# Patient Record
Sex: MALE | Race: WHITE
[De-identification: names, ages, dates, MRNs, and addresses within clinical notes are randomized per-mention and may not be internally consistent; named-entity substitution may affect disease eponyms.]

---

## 2019-06-17 ENCOUNTER — HOSPITAL ENCOUNTER (EMERGENCY)
Dept: HOSPITAL 88 - ER | Age: 59
Discharge: HOME | End: 2019-06-17
Payer: COMMERCIAL

## 2019-06-17 VITALS — WEIGHT: 270 LBS | BODY MASS INDEX: 36.57 KG/M2 | HEIGHT: 72 IN

## 2019-06-17 VITALS — SYSTOLIC BLOOD PRESSURE: 136 MMHG | DIASTOLIC BLOOD PRESSURE: 86 MMHG

## 2019-06-17 DIAGNOSIS — I10: ICD-10-CM

## 2019-06-17 DIAGNOSIS — R31.0: Primary | ICD-10-CM

## 2019-06-17 DIAGNOSIS — R30.0: ICD-10-CM

## 2019-06-17 LAB
BACTERIA URNS QL MICRO: (no result) /HPF
BILIRUB UR QL: NEGATIVE
CLARITY UR: (no result)
COLOR UR: (no result)
DEPRECATED RBC URNS MANUAL-ACNC: >50 /HPF (ref 0–5)
EPI CELLS URNS QL MICRO: (no result) /LPF
KETONES UR QL STRIP.AUTO: NEGATIVE
LEUKOCYTE ESTERASE UR QL STRIP.AUTO: NEGATIVE
NITRITE UR QL STRIP.AUTO: NEGATIVE
PROT UR QL STRIP.AUTO: (no result)
SP GR UR STRIP: <=1.005 (ref 1.01–1.02)
UROBILINOGEN UR STRIP-MCNC: 0.2 MG/DL (ref 0.2–1)
WBC #/AREA URNS HPF: (no result) /HPF (ref 0–5)

## 2019-06-17 PROCEDURE — 81001 URINALYSIS AUTO W/SCOPE: CPT

## 2019-06-17 PROCEDURE — 99283 EMERGENCY DEPT VISIT LOW MDM: CPT

## 2019-06-26 ENCOUNTER — HOSPITAL ENCOUNTER (EMERGENCY)
Dept: HOSPITAL 88 - FSED | Age: 59
Discharge: HOME | End: 2019-06-26
Payer: COMMERCIAL

## 2019-06-26 VITALS — HEIGHT: 72 IN | BODY MASS INDEX: 37.25 KG/M2 | WEIGHT: 275 LBS

## 2019-06-26 VITALS — SYSTOLIC BLOOD PRESSURE: 119 MMHG | DIASTOLIC BLOOD PRESSURE: 80 MMHG

## 2019-06-26 DIAGNOSIS — I10: ICD-10-CM

## 2019-06-26 DIAGNOSIS — R26.2: ICD-10-CM

## 2019-06-26 DIAGNOSIS — M25.561: Primary | ICD-10-CM

## 2019-06-26 DIAGNOSIS — L03.115: ICD-10-CM

## 2019-06-26 PROCEDURE — 85025 COMPLETE CBC W/AUTO DIFF WBC: CPT

## 2019-06-26 PROCEDURE — 99284 EMERGENCY DEPT VISIT MOD MDM: CPT

## 2019-06-26 NOTE — DIAGNOSTIC IMAGING REPORT
Exam:  Right knee 3 views



History:  Pain with weightbearing



Comparison: None.



Findings: No acute, displaced fracture or dislocation. Joint spaces are

well-maintained. No definite joint effusion. Soft tissues are unremarkable.



Impression: No acute osseous abnormality.





Signed by: Dr. Jose Trujillo M.D. on 6/26/2019 11:56 AM

## 2019-06-28 ENCOUNTER — HOSPITAL ENCOUNTER (OUTPATIENT)
Dept: HOSPITAL 88 - CT | Age: 59
End: 2019-06-28
Attending: UROLOGY
Payer: COMMERCIAL

## 2019-06-28 DIAGNOSIS — R31.0: Primary | ICD-10-CM

## 2019-06-28 DIAGNOSIS — N20.0: ICD-10-CM

## 2019-06-28 LAB
BUN SERPL-MCNC: 12 MG/DL (ref 7–26)
BUN/CREAT SERPL: 13 (ref 6–25)
EGFRCR SERPLBLD CKD-EPI 2021: > 60 ML/MIN (ref 60–?)

## 2019-06-28 PROCEDURE — 82565 ASSAY OF CREATININE: CPT

## 2019-06-28 PROCEDURE — 74178 CT ABD&PLV WO CNTR FLWD CNTR: CPT

## 2019-06-28 PROCEDURE — 84520 ASSAY OF UREA NITROGEN: CPT

## 2019-06-28 PROCEDURE — 36415 COLL VENOUS BLD VENIPUNCTURE: CPT

## 2019-06-28 NOTE — DIAGNOSTIC IMAGING REPORT
EXAMINATION: CT of the abdomen and pelvis with and without contrast.



TECHNIQUE: 

Spiral CT images of the abdomen and pelvis were performed from the lung bases

to the lesser trochanters after the intravenous administration of 100 cc

Isovue-370. Imaging was performed in prone position per CT urogram protocol.

Coronal and sagittal reformatted images were obtained.



COMPARISON:  None.



CLINICAL HISTORY:Gross hematuria

     

DISCUSSION:



ABDOMEN/PELVIS:



LOWER THORAX:Unremarkable.



HEPATOBILIARY: No focal hepatic lesions.  No intra-or extrahepatic biliary

ductal dilation.  The gallbladder is normal.   



SPLEEN: No splenomegaly.



PANCREAS: No focal masses or ductal dilatation. 



ADRENALS: No adrenal nodules.



KIDNEYS/URETERS: 4-5 mm right lower pole calculus with an adjacent cluster of

right lower pole calculi measuring 9 mm in aggregate dimension. 4 mm left

ureteropelvic junction calculus without hydronephrosis. Excretory phase images

show no additional filling defects within the upper collecting systems or

ureters. 8-9 mm calculus in the urinary bladder (series 3 image 170).

Subcentimeter hypoattenuating lesions in both kidneys are too small to further

characterize but likely represent small cysts.



PELVIC ORGANS/BLADDER: As above.



PERITONEUM/RETROPERITONEUM: No free air or fluid.



LYMPH NODES: No pelvic sidewall, retroperitoneal, or mesenteric

lymphadenopathy.



VESSELS: Atherosclerotic calcification of the abdominal aorta, major branch

vessels, and iliac arterial systems without aneurysmal dilatation. Retroaortic

left renal vein.



GI TRACT: The large bowel shows no distention or wall thickening. Gas and fecal

material is noted throughout. The appendix is not identified and may have been

removed. No right lower quadrant inflammation.



BONES AND SOFT TISSUE: No bony destructive lesions.    No soft tissue

abnormalities.  



IMPRESSION: 



Bilateral nonobstructing renal calculi, measuring up to 9 mm in aggregate

dimension on the right and 4 mm at the left ureteropelvic junction.



8-9 mm calculus in the dependent portion of the urinary bladder.



Atherosclerotic vascular disease.



Signed by: Dr. Jose Trujillo M.D. on 6/28/2019 4:07 PM

## 2019-09-09 ENCOUNTER — HOSPITAL ENCOUNTER (INPATIENT)
Dept: HOSPITAL 88 - ER | Age: 59
LOS: 1 days | Discharge: HOME | DRG: 206 | End: 2019-09-10
Attending: INTERNAL MEDICINE | Admitting: INTERNAL MEDICINE
Payer: COMMERCIAL

## 2019-09-09 VITALS — BODY MASS INDEX: 36.57 KG/M2 | HEIGHT: 72 IN | WEIGHT: 270 LBS

## 2019-09-09 VITALS — SYSTOLIC BLOOD PRESSURE: 128 MMHG | DIASTOLIC BLOOD PRESSURE: 78 MMHG

## 2019-09-09 VITALS — SYSTOLIC BLOOD PRESSURE: 133 MMHG | DIASTOLIC BLOOD PRESSURE: 71 MMHG

## 2019-09-09 DIAGNOSIS — E78.5: ICD-10-CM

## 2019-09-09 DIAGNOSIS — M94.0: Primary | ICD-10-CM

## 2019-09-09 DIAGNOSIS — I10: ICD-10-CM

## 2019-09-09 DIAGNOSIS — F17.210: ICD-10-CM

## 2019-09-09 LAB
ALBUMIN SERPL-MCNC: 3.7 G/DL (ref 3.5–5)
ALBUMIN/GLOB SERPL: 1.3 {RATIO} (ref 0.8–2)
ALP SERPL-CCNC: 40 IU/L (ref 40–150)
ALT SERPL-CCNC: 21 IU/L (ref 0–55)
ANION GAP SERPL CALC-SCNC: 15.1 MMOL/L (ref 8–16)
BACTERIA URNS QL MICRO: (no result) /HPF
BASOPHILS # BLD AUTO: 0 10*3/UL (ref 0–0.1)
BASOPHILS NFR BLD AUTO: 0.5 % (ref 0–1)
BILIRUB UR QL: NEGATIVE
BUN SERPL-MCNC: 13 MG/DL (ref 7–26)
BUN/CREAT SERPL: 16 (ref 6–25)
CALCIUM SERPL-MCNC: 9.3 MG/DL (ref 8.4–10.2)
CHLORIDE SERPL-SCNC: 104 MMOL/L (ref 98–107)
CK MB SERPL-MCNC: 0.9 NG/ML (ref 0–5)
CK MB SERPL-MCNC: 1 NG/ML (ref 0–5)
CK SERPL-CCNC: 80 IU/L (ref 30–200)
CK SERPL-CCNC: 92 IU/L (ref 30–200)
CLARITY UR: CLEAR
CO2 SERPL-SCNC: 25 MMOL/L (ref 22–29)
COLOR UR: YELLOW
DEPRECATED APTT PLAS QN: 30.1 SECONDS (ref 23.8–35.5)
DEPRECATED INR PLAS: 0.9
DEPRECATED NEUTROPHILS # BLD AUTO: 2.3 10*3/UL (ref 2.1–6.9)
DEPRECATED RBC URNS MANUAL-ACNC: (no result) /HPF (ref 0–5)
EGFRCR SERPLBLD CKD-EPI 2021: > 60 ML/MIN (ref 60–?)
EOSINOPHIL # BLD AUTO: 0.3 10*3/UL (ref 0–0.4)
EOSINOPHIL NFR BLD AUTO: 4.7 % (ref 0–6)
EPI CELLS URNS QL MICRO: (no result) /LPF
ERYTHROCYTE [DISTWIDTH] IN CORD BLOOD: 13.4 % (ref 11.7–14.4)
GLOBULIN PLAS-MCNC: 2.8 G/DL (ref 2.3–3.5)
GLUCOSE SERPLBLD-MCNC: 93 MG/DL (ref 74–118)
HCT VFR BLD AUTO: 40.3 % (ref 38.2–49.6)
HGB BLD-MCNC: 14 G/DL (ref 14–18)
KETONES UR QL STRIP.AUTO: NEGATIVE
LEUKOCYTE ESTERASE UR QL STRIP.AUTO: NEGATIVE
LIPASE SERPL-CCNC: 25 U/L (ref 8–78)
LYMPHOCYTES # BLD: 2.4 10*3/UL (ref 1–3.2)
LYMPHOCYTES NFR BLD AUTO: 43.2 % (ref 18–39.1)
MAGNESIUM SERPL-MCNC: 1.8 MG/DL (ref 1.3–2.1)
MCH RBC QN AUTO: 33.2 PG (ref 28–32)
MCHC RBC AUTO-ENTMCNC: 34.7 G/DL (ref 31–35)
MCV RBC AUTO: 95.5 FL (ref 81–99)
MONOCYTES # BLD AUTO: 0.5 10*3/UL (ref 0.2–0.8)
MONOCYTES NFR BLD AUTO: 8.9 % (ref 4.4–11.3)
MUCOUS THREADS URNS QL MICRO: (no result)
NEUTS SEG NFR BLD AUTO: 42.3 % (ref 38.7–80)
NITRITE UR QL STRIP.AUTO: NEGATIVE
PLATELET # BLD AUTO: 210 X10E3/UL (ref 140–360)
POTASSIUM SERPL-SCNC: 4.1 MMOL/L (ref 3.5–5.1)
PROT UR QL STRIP.AUTO: NEGATIVE
PROTHROMBIN TIME: 12.6 SECONDS (ref 11.9–14.5)
RBC # BLD AUTO: 4.22 X10E6/UL (ref 4.3–5.7)
SODIUM SERPL-SCNC: 140 MMOL/L (ref 136–145)
SP GR UR STRIP: 1.02 (ref 1.01–1.02)
TSH SERPL DL<=0.005 MIU/L-ACNC: 0.6 UIU/ML (ref 0.35–4.94)
UROBILINOGEN UR STRIP-MCNC: 0.2 MG/DL (ref 0.2–1)
WBC #/AREA URNS HPF: (no result) /HPF (ref 0–5)

## 2019-09-09 PROCEDURE — 84443 ASSAY THYROID STIM HORMONE: CPT

## 2019-09-09 PROCEDURE — 85025 COMPLETE CBC W/AUTO DIFF WBC: CPT

## 2019-09-09 PROCEDURE — 80061 LIPID PANEL: CPT

## 2019-09-09 PROCEDURE — 83735 ASSAY OF MAGNESIUM: CPT

## 2019-09-09 PROCEDURE — 85730 THROMBOPLASTIN TIME PARTIAL: CPT

## 2019-09-09 PROCEDURE — 83690 ASSAY OF LIPASE: CPT

## 2019-09-09 PROCEDURE — 93005 ELECTROCARDIOGRAM TRACING: CPT

## 2019-09-09 PROCEDURE — 71260 CT THORAX DX C+: CPT

## 2019-09-09 PROCEDURE — 85610 PROTHROMBIN TIME: CPT

## 2019-09-09 PROCEDURE — 84484 ASSAY OF TROPONIN QUANT: CPT

## 2019-09-09 PROCEDURE — 99284 EMERGENCY DEPT VISIT MOD MDM: CPT

## 2019-09-09 PROCEDURE — 71045 X-RAY EXAM CHEST 1 VIEW: CPT

## 2019-09-09 PROCEDURE — 83880 ASSAY OF NATRIURETIC PEPTIDE: CPT

## 2019-09-09 PROCEDURE — 36415 COLL VENOUS BLD VENIPUNCTURE: CPT

## 2019-09-09 PROCEDURE — 81001 URINALYSIS AUTO W/SCOPE: CPT

## 2019-09-09 PROCEDURE — 82550 ASSAY OF CK (CPK): CPT

## 2019-09-09 PROCEDURE — 84100 ASSAY OF PHOSPHORUS: CPT

## 2019-09-09 PROCEDURE — 82553 CREATINE MB FRACTION: CPT

## 2019-09-09 PROCEDURE — 93017 CV STRESS TEST TRACING ONLY: CPT

## 2019-09-09 PROCEDURE — 80053 COMPREHEN METABOLIC PANEL: CPT

## 2019-09-09 PROCEDURE — 78452 HT MUSCLE IMAGE SPECT MULT: CPT

## 2019-09-09 PROCEDURE — 93306 TTE W/DOPPLER COMPLETE: CPT

## 2019-09-09 RX ADMIN — ENOXAPARIN SODIUM SCH MG: 100 INJECTION SUBCUTANEOUS at 12:35

## 2019-09-09 RX ADMIN — NICOTINE SCH MG: 21 PATCH, EXTENDED RELEASE TRANSDERMAL at 09:37

## 2019-09-09 RX ADMIN — METOPROLOL TARTRATE SCH MG: 25 TABLET, FILM COATED ORAL at 21:00

## 2019-09-09 RX ADMIN — FAMOTIDINE SCH MG: 10 INJECTION, SOLUTION INTRAVENOUS at 09:38

## 2019-09-09 RX ADMIN — LISINOPRIL SCH MG: 10 TABLET ORAL at 09:39

## 2019-09-09 RX ADMIN — ENOXAPARIN SODIUM SCH MG: 100 INJECTION SUBCUTANEOUS at 21:00

## 2019-09-09 RX ADMIN — FAMOTIDINE SCH MG: 10 INJECTION, SOLUTION INTRAVENOUS at 21:00

## 2019-09-09 RX ADMIN — Medication SCH MG: at 09:30

## 2019-09-09 NOTE — XMS REPORT
Patient Summary Document

                             Created on: 2019



BECKY REID

External Reference #: [id number]

: 1960

Sex: Male



Demographics







                          Address                   [address] [place], [place] [address]

 

                          Home Phone                [phone]

 

                          Preferred Language        Unknown

 

                          Marital Status            Unknown

 

                          Shinto Affiliation     Unknown

 

                          Race                      Unknown

 

                                        Additional Race(s)  

 

                          Ethnic Group              Unknown





Author







                          Author                    [facility]

 

                          Address                   Unknown

 

                          Phone                     Unavailable







Care Team Providers







                    Care Team Member Name    Role                Phone

 

                    CURTIS RAZA    Unavailable         Unavailable

 

                    MEGHANN BUENO    Unavailable         Unavailable







Problems

This patient has no known problems.



Allergies, Adverse Reactions, Alerts

This patient has no known allergies or adverse reactions.



Medications

This patient has no known medications.



Results







           Test Description    Test Time    Test Comments    Text Results    Atomic Results    Result

 Comments

 

                CT ABDOMEN/PELVIS WOW    2019 15:59:00                                                     

                                                     [facility]                        [address] [address]      Patient Name: BECKY REID                                
  MR #: [id number]                     : 1960                          
        Age/Sex: 58/M  Acct #: [id number]                              Req #: 
[id number]  Adm Physician:                                                      
Ordered by: CURTIS RAZA MD                            Report #: [id number]   
    Location: CT                                      Room/Bed:                 
   
___________________________________________________________________________________________________
   Procedure: [id number] CT/CT ABDOMEN/PELVIS WOW  Exam Date: 19           
                Exam Time: 1440                                              
REPORT STATUS: Signed    EXAMINATION: CT of the abdomen and pelvis with and 
without contrast.      TECHNIQUE:    Spiral CT images of the abdomen and pelvis 
were performed from the lung bases   to the lesser trochanters after the 
intravenous administration of 100 cc   Isovue-370. Imaging was performed in 
prone position per CT urogram protocol.   Coronal and sagittal reformatted 
images were obtained.      COMPARISON:  None.      CLINICAL HISTORY:Gross 
hematuria           DISCUSSION:      ABDOMEN/PELVIS:      LOWER 
THORAX:Unremarkable.      HEPATOBILIARY: No focal hepatic lesions.  No intra-or 
extrahepatic biliary   ductal dilation.  The gallbladder is normal.         
SPLEEN: No splenomegaly.      PANCREAS: No focal masses or ductal dilatation.   
   ADRENALS: No adrenal nodules.      KIDNEYS/URETERS: 4-5 mm right lower pole 
calculus with an adjacent cluster of   right lower pole calculi measuring 9 mm 
in aggregate dimension. 4 mm left   ureteropelvic junction calculus without 
hydronephrosis. Excretory phase images   show no additional filling defects 
within the upper collecting systems or   ureters. 8-9 mm calculus in the urinary
bladder (series 3 image 170).   Subcentimeter hypoattenuating lesions in both 
kidneys are too small to further   characterize but likely represent small 
cysts.      PELVIC ORGANS/BLADDER: As above.      PERITONEUM/RETROPERITONEUM: No
free air or fluid.      LYMPH NODES: No pelvic sidewall, retroperitoneal, or 
mesenteric   lymphadenopathy.      VESSELS: Atherosclerotic calcification of the
abdominal aorta, major branch   vessels, and iliac arterial systems without 
aneurysmal dilatation. Retroaortic   left renal vein.      GI TRACT: The large 
bowel shows no distention or wall thickening. Gas and fecal   material is noted 
throughout. The appendix is not identified and may have been   removed. No right
lower quadrant inflammation.      BONES AND SOFT TISSUE: No bony destructive 
lesions.    No soft tissue   abnormalities.        IMPRESSION:       Bilateral 
nonobstructing renal calculi, measuring up to 9 mm in aggregate   dimension on 
the right and 4 mm at the left ureteropelvic junction.      8-9 mm calculus in 
the dependent portion of the urinary bladder.      Atherosclerotic vascular 
disease.      Signed by: Dr. Isabel Sutherland M.D. on 2019 4:07 PM        
Dictated By: ISABEL SUTHERLAND MD  Electronically Signed By: ISABEL SUTHERLAND MD on 
19 1604  Transcribed By: TINY on 19 1606       COPY TO:   
CURTIS RAZA MD                        

 

                KNEE 3VW RT - HOPD    2019 11:54:00                                                        

                                                  [place] [address]      Patient Name: BECKY REID                                
  MR #: [id number]                     : 1960                          
        Age/Sex: 58/M  Acct #: [id number]                              Req #: 
[id number]  Adm Physician:                                                      
Ordered by: LEVY BUENO MD                            Report #: [id number]  
     Location: [facility]                                    Room/Bed:                
    
___________________________________________________________________________________________________
   Procedure: [id number] HOPD/KNEE 3VW RT - HOPD  Exam Date: 19            
               Exam Time: 1145                                              
REPORT STATUS: Signed       Exam:  Right knee 3 views      History:  Pain with 
weightbearing      Comparison: None.      Findings: No acute, displaced fracture
or dislocation. Joint spaces are   well-maintained. No definite joint effusion. 
Soft tissues are unremarkable.      Impression: No acute osseous abnormality.   
     Signed by: Dr. Isabel Sutherland M.D. on 2019 11:56 AM        Dictated 
By: ISABEL SUTHERLAND MD  Electronically Signed By: ISABEL SUTHERLAND MD on 19 11
56  Transcribed By: TINY on 19 0482       COPY TO:   LEVY BUENO MD

## 2019-09-09 NOTE — DIAGNOSTIC IMAGING REPORT
EXAMINATION:  CHEST SINGLE (PORTABLE)    



INDICATION: Chest pain



COMPARISON: None

     

FINDINGS:



LINES/TUBES:EKG leads overlie the chest.



LUNGS:The lungs are moderately inflated. No focal consolidation or pulmonary

edema. Mild subsegmental atelectasis at the right lung base.



PLEURA:No pleural effusion or pneumothorax.



MEDIASTINUM:The cardiomediastinal silhouette is mildly enlarged.



BONES/SOFT TISSUES:No acute osseous injury.



ABDOMEN:No free air under the diaphragm.





IMPRESSION: 

No focal pneumonia or pulmonary edema. Mild subsegmental atelectasis at the

right lung base.



Mild cardiomegaly.



Signed by: Kelly Pascal MD on 9/9/2019 9:23 AM

## 2019-09-09 NOTE — DIAGNOSTIC IMAGING REPORT
EXAM: CT Chest WITH contrast- Pulmonary Embolism Protocol



INDICATION: Chest pain 



COMPARISON: Chest radiograph of earlier the same day.



TECHNIQUE:

Chest was scanned utilizing a multidetector helical scanner from the lung apex

through the level of the diaphragm after administration of IV contrast. Thin

section reconstructions were obtained with special concentration on the

pulmonary arteries. Coronal and sagittal reformations were obtained. Pulmonary

embolism protocol was performed.

           IV CONTRAST: 100 cc of Isovue 370

           RADIATION DOSE: Total DLP: 617.95 mGy*cm

            

Dose modulation, iterative reconstruction, and/or weight based adjustment of

the mA/kV was utilized to reduce the radiation dose to as low as reasonably

achievable. 



           COMPLICATIONS: None



FINDINGS:



LINES/ TUBES: None.



PULMONARY ARTERIES: No filling defect is identified within the pulmonary

arteries to the segmental level. The subsegmental pulmonary arteries are not

well opacified. Main pulmonary artery measures 3.4 in diameter.



LUNGS AND AIRWAYS: The central airways are patent. No focal consolidation. Mild

bibasilar dependent subsegmental atelectasis. No suspicious pulmonary nodules.



PLEURA: The pleural spaces are clear.



HEART AND MEDIASTINUM: The thyroid gland appears unremarkable. No

supraclavicular, axillary, subpectoral, or mediastinal lymphadenopathy. Mild

cardiomegaly. No pericardial effusion. No right heart strain.



UPPER ABDOMEN: Limited contrast-enhanced views of the upper abdomen demonstrate

no focal abnormality in the partially visualized liver, gallbladder, spleen,

pancreas, adrenals, or upper most kidneys.



BONES: No acute osseous injury. Mild degenerative changes of the visualized

spine. No suspicious lytic or blastic lesions.



SOFT TISSUES: Unremarkable.



IMPRESSION: 

No pulmonary embolism.



No focal lung consolidation.



Mild cardiomegaly.



Signed by: Kelly Pascal MD on 9/9/2019 11:55 AM

## 2019-09-09 NOTE — XMS REPORT
Clinical Summary

                             Created on: 2019



Jai Mauricio

External Reference #: [id number]

: 1960

Sex: Male



Demographics







                          Address                   [address] [place]  [address]

 

                          Home Phone                [phone]

 

                          Preferred Language        English

 

                          Marital Status            

 

                          Evangelical Affiliation     Unknown

 

                          Race                      White

 

                          Ethnic Group              Non-





Author







                          Author                    [facility]

 

                          Organization              [facility]

 

                          Address                   Unknown

 

                          Phone                     Unavailable







Support







                Name            Relationship    Address         Phone

 

                Migdalia Cheatham    [employer]            Unknown         [phone]







Care Team Providers







                    Care Team Member Name    Role                Phone

 

                    Paul Kaufman MD    PCP                 [phone]







Allergies

No Known Allergies



Medications







                          End Date                  Status



              Medication     Sig          Dispensed     Refills      Start  



                                         Date  

 

                                                    Active



                 valsartan-hydrochlorothia     TK 1 T PO QD      11                



                           zide (DIOVAN-HCT) 160-25        9  



                                         mg per tablet      

 

                                                    Active



                     tamsulosin (FLOMAX) 0.4       0                   07/10/201  



                           mg capsule                9  

 

                                                    Active



                 simvastatin (ZOCOR) 20 MG     TK 1 T PO QHS      11                



                           tablet                    9  

 

                                                    Active



                 metoprolol succinate XL     TK 1 T PO QHS      11                



                           (TOPROL-XL) 50 mg 24 hr        9  



                                         tablet      

 

                                                    Active



                 dutasteride (AVODART) 0.5     TK ONE C PO      11                



                     mg capsule          QHS                 9  







Active Problems







 



                           Problem                   Noted Date

 

 



                           Calculus, kidney          07/10/2019







Encounters







                          Care Team                 Description



                     Date                Type                Specialty  

 

                                        



Pasha Hernandez MD                  [facility]



                     07/10/2019          Surgery             General Surgery  

 

                                        



Wilver Tsai MD Mathew, Jibie Elizabeth                  



                     07/10/2019          Anesthesia          General Surgery  



                                         Event   

 

                                        



Pasha Hernandez MD                   



                     07/10/2019          Hospital            General Surgery  



                                         Encounter   



after 2018



Family History







   



                 Medical History     Relation        Name            Comments

 

   



                           Cancer                    Brother  

 

   



                           Cancer                    Father  

 

   



                           Diabetes                  Mother  









   



                 Relation        Name            Status          Comments

 

   



                           Brother                   prostate

 

   



                           Father                    prostate

 

   



                                         Mother   







Social History







                                        Date



                 Tobacco Use     Types           Packs/Day       Years Used 

 

                                         



                 Current Every Day Smoker     Cigarettes      0.5             40 

 

    



                                         Smokeless Tobacco: Never   



                                         Used   









                                        Tobacco Cessation: Ready to Quit: No; Counseling Given: No











                    Drinks/Week         oz/Week             Comments



                                         Alcohol Use   

 

                                        



5 Shots of liquor         5.0                        



                                         Yes   









 



                           Sex Assigned at Birth     Date Recorded

 

 



                                         Not on file 









                                        Industry



                           Job Start Date            Occupation 

 

                                        Not on file



                           Not on file               Not on file 









                                        Travel End



                           Travel History            Travel Start 

 





                                         No recent travel history available.







Last Filed Vital Signs







                    Reading             Time Taken          Comments



                                         Vital Sign   

 

                    149/84              07/10/2019  4:50 PM CDT     



                                         Blood Pressure   

 

                    61                  07/10/2019  4:50 PM CDT     



                                         Pulse   

 

                    36.4 C (97.6 F)    07/10/2019  4:21 PM CDT     



                                         Temperature   

 

                    17                  07/10/2019  4:50 PM CDT     



                                         Respiratory Rate   

 

                    99%                 07/10/2019  4:50 PM CDT     



                                         Oxygen Saturation   

 

                    -                   -                    



                                         Inhaled Oxygen   



                                         Concentration   

 

                    -                   -                    



                                         Weight   

 

                    -                   -                    



                                         Height   

 

                    -                   -                    



                                         Body Mass Index   







Plan of Treatment







   



                 Health Maintenance     Due Date        Last Done       Comments

 

   



                           COLONOSCOPY SCREENING     10/20/2010  

 

   



                           SHINGLES VACCINES (#1)     10/20/2010  

 

   



                           INFLUENZA VACCINE         2019  







Procedures







                                        Comments



                 Procedure Name     Priority        Date/Time       Associated Diagnosis 

 

                                         



                     TX AN ELECTIVE      Routine             07/10/2019  



                           SUPRAGLOTTIC AIRWAY       3:23 PM CDT  

 

  



                                         Procedure



                                         Note -



                                         William Blanchard



                                         -



                                         07/10/2019



                                         3:23 PM



                                         CDT



                                         Airway



                                         Performed



                                         by:



                                         William Blanchard



                                         Authorized



                                         by:



                                         Peña Tsai MD





                                         Location:



                                         OR



                                         Urgency:



                                         Elective



                                         Difficult



                                         Airway: No



                                         Anesthesio



                                         logist:



                                         Peña Tsai MD



                                         Resident/C



                                         RNA/AA:



                                         William Blanchard



                                         Performed



                                         by:



                                         resident/C



                                         RNA/AA



                                         Preoxygena



                                         elayne with



                                         100% O2:



                                         Yes



                                         C-spine



                                         Precaution



                                         s



                                         Maintained



                                         Throughout



                                         : Yes



                                         Mask



                                         Ventilatio



                                         n:  Not



                                         attempted



                                         Final



                                         Airway



                                         Type:



                                         Supraglott



                                         ic airway



                                         Final LMA:



                                         Unique



                                         LMA Size:



                                         5



                                         Number of



                                         Attempts



                                         at



                                         Approach:



                                         1



 

                                         



                     EXTRACORPOREAL SHOCKWAVE      07/10/2019          RIGHT KIDNEY STONE N20.0 



                           LITHOTRIPSY (ESWL)        3:11 PM CDT  

 

  



                                         Special



                                         Needs



                                         [facility]



                                         CONF#



                                         [id number]



                                         [facility] PT



                                         WILL BE AM



                                         ARRIVAL/BL

 

                                        



Results for this procedure are in the results section.



                     XR ABDOMEN 1 VW     STAT                07/10/2019  



                                         12:20 PM CDT  



after 2018



Results

* XR Abdomen 1 Vw (07/10/2019 12:20 PM CDT)









                                         Specimen

 











 



                           Narrative                 Performed At

 

 



                           EXAMINATION:XR ABDOMEN 1 VW     [facility]



                                         CLINICAL HISTORY:kidney stone 



                                         COMPARISON:None. 



                                         IMPRESSION: 



                                         1.There is a 9 mm calculus in the right lower renal pole, and a couple of 



                                         additional sub-5 mm right intrarenal lower pole calculi. No left renal calculus

 



                                         is seen. 



                                         2.Calculi in the pelvis are indeterminate but likely phleboliths. The 



                                         largest is 11 mm to the right of the lower sacrum. 



                                         3.The bowel gas pattern is normal. 



                                         4.There are degenerative changes in the hips. 



                                         [id number] 









                                        Procedure Note

 

                                        



Hm Interface, Radiology Results Incoming - 07/10/2019 12:29 PM CDT



EXAMINATION:  XR ABDOMEN 1 VW



CLINICAL HISTORY:  kidney stone



COMPARISON:  None.



IMPRESSION:

                                        1.  There is a 9 mm calculus in the right lower renal pole, and a couple of additional

 sub-5 mm right intrarenal lower pole calculi. No left renal calculus is seen.

                                        2.  Calculi in the pelvis are indeterminate but likely phleboliths. The largest 

is 11 mm to the right of the lower sacrum.

                                        3.  The bowel gas pattern is normal.

                                        4.  There are degenerative changes in the hips.



[id number]









   



                 Performing Organization     Address         City/State/Zipcode     Phone Number

 

   



                      [facility]          [address] [place] [address] 





after 2018



Insurance







                                        Type



            Payer      Benefit     Subscriber ID     Effective     Phone      Address 



                           Plan /                    Dates   



                                         Group     

 

                                        O



                 [facility]             [facility]             [address]        2018-P   



                           [employer]                resent   



                                         [facility]     



                                         CHOICE     



                                         [facility]     









     



            Guarantor Name     Account     Relation to     Date of     Phone      Billing Address



                     Type                Patient             Birth  

 

     



            Jai Mauricio     Personal/F     Self       1960     [phone] [address]               (Home)              [place] [address]







Advance Directives





For more information, please contact: [phone]









                          Patient Representative    Explanation



                           Type                      Date Recorded  

 

                                                     



                                         Advance Directives,   



                                         Living Will and   



                                         Medical Power of

## 2019-09-09 NOTE — NUR
Recvd patient from ER via wheelchair accompanied with his wife. AAOx3 ambulates. chest pain 
rating 4/10, no distress noted, call light in reach

## 2019-09-09 NOTE — XMS REPORT
Clinical Summary

                             Created on: 2019



Jai Mauricio

External Reference #: [id number]

: 1960

Sex: Male



Demographics







                          Address                   [address] [place]  [address]

 

                          Home Phone                [phone]

 

                          Preferred Language        English

 

                          Marital Status            

 

                          Rastafari Affiliation     Unknown

 

                          Race                      White

 

                          Ethnic Group              Non-





Author







                          Author                    [facility]

 

                          Organization              [facility]

 

                          Address                   Unknown

 

                          Phone                     Unavailable







Support







                Name            Relationship    Address         Phone

 

                Migdalia Cheatham    [employer]            Unknown         [phone]







Care Team Providers







                    Care Team Member Name    Role                Phone

 

                    Paul Kaufman MD    PCP                 [phone]







Allergies

No Known Allergies



Medications







                          End Date                  Status



              Medication     Sig          Dispensed     Refills      Start  



                                         Date  

 

                                                    Active



                 valsartan-hydrochlorothia     TK 1 T PO QD      11                



                           zide (DIOVAN-HCT) 160-25        9  



                                         mg per tablet      

 

                                                    Active



                     tamsulosin (FLOMAX) 0.4       0                   07/10/201  



                           mg capsule                9  

 

                                                    Active



                 simvastatin (ZOCOR) 20 MG     TK 1 T PO QHS      11                



                           tablet                    9  

 

                                                    Active



                 metoprolol succinate XL     TK 1 T PO QHS      11                



                           (TOPROL-XL) 50 mg 24 hr        9  



                                         tablet      

 

                                                    Active



                 dutasteride (AVODART) 0.5     TK ONE C PO      11                



                     mg capsule          QHS                 9  







Active Problems







 



                           Problem                   Noted Date

 

 



                           Calculus, kidney          07/10/2019







Encounters







                          Care Team                 Description



                     Date                Type                Specialty  

 

                                        



Pasha Hernandez MD                  [facility]



                     07/10/2019          Surgery             General Surgery  

 

                                        



Wilver Tsai MD Mathew, Jibie Elizabeth                  



                     07/10/2019          Anesthesia          General Surgery  



                                         Event   

 

                                        



Pasha Hernandez MD                   



                     07/10/2019          Hospital            General Surgery  



                                         Encounter   



after 2018



Family History







   



                 Medical History     Relation        Name            Comments

 

   



                           Cancer                    Brother  

 

   



                           Cancer                    Father  

 

   



                           Diabetes                  Mother  









   



                 Relation        Name            Status          Comments

 

   



                           Brother                   prostate

 

   



                           Father                    prostate

 

   



                                         Mother   







Social History







                                        Date



                 Tobacco Use     Types           Packs/Day       Years Used 

 

                                         



                 Current Every Day Smoker     Cigarettes      0.5             40 

 

    



                                         Smokeless Tobacco: Never   



                                         Used   









                                        Tobacco Cessation: Ready to Quit: No; Counseling Given: No











                    Drinks/Week         oz/Week             Comments



                                         Alcohol Use   

 

                                        



5 Shots of liquor         5.0                        



                                         Yes   









 



                           Sex Assigned at Birth     Date Recorded

 

 



                                         Not on file 









                                        Industry



                           Job Start Date            Occupation 

 

                                        Not on file



                           Not on file               Not on file 









                                        Travel End



                           Travel History            Travel Start 

 





                                         No recent travel history available.







Last Filed Vital Signs







                    Reading             Time Taken          Comments



                                         Vital Sign   

 

                    149/84              07/10/2019  4:50 PM CDT     



                                         Blood Pressure   

 

                    61                  07/10/2019  4:50 PM CDT     



                                         Pulse   

 

                    36.4 C (97.6 F)    07/10/2019  4:21 PM CDT     



                                         Temperature   

 

                    17                  07/10/2019  4:50 PM CDT     



                                         Respiratory Rate   

 

                    99%                 07/10/2019  4:50 PM CDT     



                                         Oxygen Saturation   

 

                    -                   -                    



                                         Inhaled Oxygen   



                                         Concentration   

 

                    -                   -                    



                                         Weight   

 

                    -                   -                    



                                         Height   

 

                    -                   -                    



                                         Body Mass Index   







Plan of Treatment







   



                 Health Maintenance     Due Date        Last Done       Comments

 

   



                           COLONOSCOPY SCREENING     10/20/2010  

 

   



                           SHINGLES VACCINES (#1)     10/20/2010  

 

   



                           INFLUENZA VACCINE         2019  







Procedures







                                        Comments



                 Procedure Name     Priority        Date/Time       Associated Diagnosis 

 

                                         



                     NC AN ELECTIVE      Routine             07/10/2019  



                           SUPRAGLOTTIC AIRWAY       3:23 PM CDT  

 

  



                                         Procedure



                                         Note -



                                         William Blanchard



                                         -



                                         07/10/2019



                                         3:23 PM



                                         CDT



                                         Airway



                                         Performed



                                         by:



                                         William Blanchard



                                         Authorized



                                         by:



                                         Peña Tsai MD





                                         Location:



                                         OR



                                         Urgency:



                                         Elective



                                         Difficult



                                         Airway: No



                                         Anesthesio



                                         logist:



                                         Peña Tsai MD



                                         Resident/C



                                         RNA/AA:



                                         William Blanchard



                                         Performed



                                         by:



                                         resident/C



                                         RNA/AA



                                         Preoxygena



                                         elayne with



                                         100% O2:



                                         Yes



                                         C-spine



                                         Precaution



                                         s



                                         Maintained



                                         Throughout



                                         : Yes



                                         Mask



                                         Ventilatio



                                         n:  Not



                                         attempted



                                         Final



                                         Airway



                                         Type:



                                         Supraglott



                                         ic airway



                                         Final LMA:



                                         Unique



                                         LMA Size:



                                         5



                                         Number of



                                         Attempts



                                         at



                                         Approach:



                                         1



 

                                         



                     EXTRACORPOREAL SHOCKWAVE      07/10/2019          RIGHT KIDNEY STONE N20.0 



                           LITHOTRIPSY (ESWL)        3:11 PM CDT  

 

  



                                         Special



                                         Needs



                                         [facility]



                                         CONF#



                                         [id number]



                                         [facility] PT



                                         WILL BE AM



                                         ARRIVAL/BL

 

                                        



Results for this procedure are in the results section.



                     XR ABDOMEN 1 VW     STAT                07/10/2019  



                                         12:20 PM CDT  



after 2018



Results

* XR Abdomen 1 Vw (07/10/2019 12:20 PM CDT)









                                         Specimen

 











 



                           Narrative                 Performed At

 

 



                           EXAMINATION:XR ABDOMEN 1 VW     [facility]



                                         CLINICAL HISTORY:kidney stone 



                                         COMPARISON:None. 



                                         IMPRESSION: 



                                         1.There is a 9 mm calculus in the right lower renal pole, and a couple of 



                                         additional sub-5 mm right intrarenal lower pole calculi. No left renal calculus

 



                                         is seen. 



                                         2.Calculi in the pelvis are indeterminate but likely phleboliths. The 



                                         largest is 11 mm to the right of the lower sacrum. 



                                         3.The bowel gas pattern is normal. 



                                         4.There are degenerative changes in the hips. 



                                         [id number] 









                                        Procedure Note

 

                                        



Hm Interface, Radiology Results Incoming - 07/10/2019 12:29 PM CDT



EXAMINATION:  XR ABDOMEN 1 VW



CLINICAL HISTORY:  kidney stone



COMPARISON:  None.



IMPRESSION:

                                        1.  There is a 9 mm calculus in the right lower renal pole, and a couple of additional

 sub-5 mm right intrarenal lower pole calculi. No left renal calculus is seen.

                                        2.  Calculi in the pelvis are indeterminate but likely phleboliths. The largest 

is 11 mm to the right of the lower sacrum.

                                        3.  The bowel gas pattern is normal.

                                        4.  There are degenerative changes in the hips.



[id number]









   



                 Performing Organization     Address         City/State/Zipcode     Phone Number

 

   



                      [facility]          [address] [place] [address] 





after 2018



Insurance







                                        Type



            Payer      Benefit     Subscriber ID     Effective     Phone      Address 



                           Plan /                    Dates   



                                         Group     

 

                                        O



                 [facility]             [facility]             xxxxxxxx        2018-P   



                           [employer]                resent   



                                         THCARE     



                                         CHOICE     



                                         [facility]     









     



            Guarantor Name     Account     Relation to     Date of     Phone      Billing Address



                     Type                Patient             Birth  

 

     



            Jai Mauricio     Personal/F     Self       1960     [phone] [address]               (Home)              [place] [address]







Advance Directives





For more information, please contact: [phone]









                          Patient Representative    Explanation



                           Type                      Date Recorded  

 

                                                     



                                         Advance Directives,   



                                         Living Will and   



                                         Medical Power of

## 2019-09-09 NOTE — HISTORY AND PHYSICAL
Mr. Mauricio is a very pleasant 58-year-old man, who presents to the emergency room this

morning with a complaint of left-sided chest discomfort. 

 

HISTORY OF PRESENT ILLNESS:  The patient reports that this began when he woke up about 4

o'clock this morning, which is the usual time for him to get ready to go to work, but he

reports it was a left-sided squeezing chest discomfort that had some radiation to both

arms and hands.  He reports he did have a similar symptom like this 3 or 4 years ago and

had an ER evaluation that evidently was unrevealing and has not recurred until this

time. 

 

PAST MEDICAL HISTORY:  Significant for hypertension and hyperlipidemia.  He has had

previous prostate problems and takes a medication for that. 

 

MEDICATIONS:  He reports his current home medications include aspirin 81 mg daily,

vitamin B12, Avodart 0.5 mg daily, metoprolol tartrate 50 mg at bedtime, simvastatin 20

mg daily, Flomax 0.4 mg daily, and Diovan 160 mg daily. 

 

PERSONAL AND SOCIAL HISTORY:  The patient started smoking as a teenager, which is about

40 years ago. 

 

PAST SURGICAL HISTORY:  The patient had appendectomy and tonsillectomy as a child.

 

REVIEW OF SYSTEMS:

CARDIAC:  Not known any cardiac diagnosis in the past.

 

PHYSICAL EXAMINATION:

GENERAL:  At this time shows a large obese white man, who is uncomfortable. 

VITAL SIGNS:  Current blood pressure 140/70.  He reports he is about 6 feet tall and

thinks he is about 270 pounds. 

HEAD, EYES, EARS, NOSE, AND THROAT:  Unremarkable. 

NECK:  No jugular venous distention.  No bruits. 

THORAX:  Left chest wall very tender to palpation. 

HEART:  Sounds S1 and S2 are equal.  No murmurs. 

LUNGS:  Clear. 

ABDOMEN:  Protuberant.  Normal bowel sounds. 

EXTREMITIES:  No cyanosis, clubbing, or edema.  Distal pulses are intact.

PERTINENT LABORATORY STUDIES:  Show white cell count 5.5 and hemoglobin 14.0.  Chemistry

show normal sodium and potassium, BUN 13, creatinine 0.8, and glucose 93.  Troponins

normal.  BNP 16.  TSH 0.6.  Urinalysis shows 6 to 10 white cells and 0 to 5 red cells.

Chest x-ray suggests some atelectasis. 

 

ASSESSMENT:  

1. Atypical chest discomfort.

2. Chest wall pain.

3. Hypertension.

4. Hyperlipidemia.

5. History of tobacco abuse.

 

PLAN:  We will monitor him carefully.  Give one dose of IV Solu-Cortef and recheck

cardiac enzymes, echocardiogram, and plan stress Cardiolite for the morning.  Further

management based on clinical course. 

 

 

 

 

______________________________

MD LOREN Olson/RICHARD

D:  09/09/2019 11:01:06

T:  09/09/2019 16:56:58

Job #:  [id number]

 

cc:            Paul Kaufman

## 2019-09-10 VITALS — SYSTOLIC BLOOD PRESSURE: 128 MMHG | DIASTOLIC BLOOD PRESSURE: 73 MMHG

## 2019-09-10 VITALS — SYSTOLIC BLOOD PRESSURE: 139 MMHG | DIASTOLIC BLOOD PRESSURE: 87 MMHG

## 2019-09-10 VITALS — DIASTOLIC BLOOD PRESSURE: 78 MMHG | SYSTOLIC BLOOD PRESSURE: 133 MMHG

## 2019-09-10 VITALS — SYSTOLIC BLOOD PRESSURE: 118 MMHG | DIASTOLIC BLOOD PRESSURE: 77 MMHG

## 2019-09-10 LAB
ALBUMIN SERPL-MCNC: 3.4 G/DL (ref 3.5–5)
ALBUMIN/GLOB SERPL: 1.4 {RATIO} (ref 0.8–2)
ALP SERPL-CCNC: 34 IU/L (ref 40–150)
ALT SERPL-CCNC: 17 IU/L (ref 0–55)
ANION GAP SERPL CALC-SCNC: 12.4 MMOL/L (ref 8–16)
BASOPHILS # BLD AUTO: 0 10*3/UL (ref 0–0.1)
BASOPHILS NFR BLD AUTO: 0.2 % (ref 0–1)
BUN SERPL-MCNC: 16 MG/DL (ref 7–26)
BUN/CREAT SERPL: 19 (ref 6–25)
CALCIUM SERPL-MCNC: 8.9 MG/DL (ref 8.4–10.2)
CHLORIDE SERPL-SCNC: 102 MMOL/L (ref 98–107)
CHOLEST SERPL-MCNC: 184 MD/DL (ref 0–199)
CHOLEST/HDLC SERPL: 4.2 {RATIO} (ref 3.9–4.7)
CK MB SERPL-MCNC: 0.7 NG/ML (ref 0–5)
CK SERPL-CCNC: 74 IU/L (ref 30–200)
CO2 SERPL-SCNC: 28 MMOL/L (ref 22–29)
DEPRECATED NEUTROPHILS # BLD AUTO: 4.2 10*3/UL (ref 2.1–6.9)
DEPRECATED PHOSPHATE SERPL-MCNC: 3 MG/DL (ref 2.3–4.7)
EGFRCR SERPLBLD CKD-EPI 2021: > 60 ML/MIN (ref 60–?)
EOSINOPHIL # BLD AUTO: 0.1 10*3/UL (ref 0–0.4)
EOSINOPHIL NFR BLD AUTO: 1.6 % (ref 0–6)
ERYTHROCYTE [DISTWIDTH] IN CORD BLOOD: 13.2 % (ref 11.7–14.4)
GLOBULIN PLAS-MCNC: 2.5 G/DL (ref 2.3–3.5)
GLUCOSE SERPLBLD-MCNC: 95 MG/DL (ref 74–118)
HCT VFR BLD AUTO: 36.7 % (ref 38.2–49.6)
HDLC SERPL-MSCNC: 44 MG/DL (ref 40–60)
HGB BLD-MCNC: 12.5 G/DL (ref 14–18)
LDLC SERPL CALC-MCNC: 89 MG/DL (ref 60–130)
LYMPHOCYTES # BLD: 3 10*3/UL (ref 1–3.2)
LYMPHOCYTES NFR BLD AUTO: 36.6 % (ref 18–39.1)
MAGNESIUM SERPL-MCNC: 1.9 MG/DL (ref 1.3–2.1)
MCH RBC QN AUTO: 32.9 PG (ref 28–32)
MCHC RBC AUTO-ENTMCNC: 34.1 G/DL (ref 31–35)
MCV RBC AUTO: 96.6 FL (ref 81–99)
MONOCYTES # BLD AUTO: 0.8 10*3/UL (ref 0.2–0.8)
MONOCYTES NFR BLD AUTO: 10.2 % (ref 4.4–11.3)
NEUTS SEG NFR BLD AUTO: 51.3 % (ref 38.7–80)
PLATELET # BLD AUTO: 194 X10E3/UL (ref 140–360)
POTASSIUM SERPL-SCNC: 3.4 MMOL/L (ref 3.5–5.1)
RBC # BLD AUTO: 3.8 X10E6/UL (ref 4.3–5.7)
SODIUM SERPL-SCNC: 139 MMOL/L (ref 136–145)
TRIGL SERPL-MCNC: 253 MG/DL (ref 0–149)

## 2019-09-10 RX ADMIN — ENOXAPARIN SODIUM SCH MG: 100 INJECTION SUBCUTANEOUS at 09:22

## 2019-09-10 RX ADMIN — NICOTINE SCH MG: 21 PATCH, EXTENDED RELEASE TRANSDERMAL at 09:22

## 2019-09-10 RX ADMIN — FAMOTIDINE SCH MG: 10 INJECTION, SOLUTION INTRAVENOUS at 09:21

## 2019-09-10 RX ADMIN — Medication SCH MG: at 13:45

## 2019-09-10 RX ADMIN — LISINOPRIL SCH MG: 10 TABLET ORAL at 13:45

## 2019-09-10 RX ADMIN — METOPROLOL TARTRATE SCH MG: 25 TABLET, FILM COATED ORAL at 13:45

## 2019-09-10 RX ADMIN — Medication PRN MG: at 16:49

## 2019-09-10 RX ADMIN — Medication PRN MG: at 15:37

## 2019-09-10 NOTE — MYOVIEW STRESS TEST
DATE OF STUDY:  09/09/2019 10:51:00

 

Stress Test - Treadmill ONLY

 

STUDY:  Stress Myoview.

 

FINDINGS:  The patient had resting perfusion images taken after injection of 11 mCi of

technetium-99m Myoview. 

 

Later, the patient exercised on David protocol for a total of 7 minutes and 1 second,

reaching 85% of age predicted maximum.  The treadmill was stopped suddenly by the

technician with no cool down.  He did not have any leg cramping however.  At maximal

exercise, he was given 33 millicuries of technetium-99m Myoview.  Perfusion images were

taken by rotational tomography. 

 

Comparison of resting and Lexiscan stress images shows no significant evidence of any

perfusion defect.  Additionally, gated wall motion images were obtained and calculated

ejection fraction is normal at 62% without regional wall motion abnormality noted. 

 

FINAL IMPRESSIONS:  

1. Normal Lexiscan Myoview for perfusion.

2. Normal left ventricular function with calculated ejection fraction of 62%.

 

 

 

 

______________________________

MD LOREN Olson/RICHARD

D:  09/10/2019 16:48:32

T:  09/10/2019 19:49:57

Job #:  [id number]

 

cc:            Dr. Gregg Waggoner

## 2019-09-10 NOTE — NUR
Visit made by the Spiritual Care Department Pastoral Visitor, Indira Clancy.  Pt sleeping 
soundly and no family present.  Pastoral Visitor left a card describing availability of 
 and instructions on how to contact a .



BEE GARCIA



Spiritual Care Department

O: [phone]

Pager: [phone] ([phone] calling [phone])

## 2019-09-10 NOTE — NUR
here to see pt and has given orders to discharge pt home. Pt verbalized 
understanding of all discharge instructions. Pt discharged with all personal belongings.

## 2019-09-11 NOTE — DISCHARGE SUMMARY
Mr. Mauricio is a very pleasant 58-year-old man, smoker with hypertension, hyperlipidemia,

who presented to the emergency room with severe chest discomfort on the 9th. 

 

HOSPITAL COURSE:  Initial evaluation demonstrated that he had very exquisite tenderness

to the left side of his chest to touch.  EKG was unremarkable as were his cardiac

enzymes.  He was given one dose of IV Solu-Cortef. 

 

Today, the patient is feeling considerably better, performed a stress test, which showed

no ischemia on EKG treadmill and Cardiolite showed normal perfusion and normal EF 61%. 

 

He was discharged home at this time to continue his previous medications and to take

Medrol Dosepak as indicated.  He will follow up with  __________ on a regular basis. 

 

DISCHARGE DIAGNOSES:  

1. Costochondritis.

2. Hypertension.

3. Hyperlipidemia.

4. Noncardiac chest pain.

 

 

 

 

______________________________

MD LOREN Olson/RICHARD

D:  09/10/2019 17:08:15

T:  09/11/2019 15:43:15

Job #:  [id number]